# Patient Record
(demographics unavailable — no encounter records)

---

## 2024-11-12 NOTE — WORK
[Sprain/Strain] : sprain/strain [Was the competent medical cause of the injury] : was the competent medical cause of the injury [Are consistent with the injury] : are consistent with the injury [Consistent with my objective findings] : consistent with my objective findings [Total (100%)] : total (100%) [Does not reveal pre-existing condition(s) that may affect treatment/prognosis] : does not reveal pre-existing condition(s) that may affect treatment/prognosis [Cannot return to work because ________] : cannot return to work because [unfilled] [FreeTextEntry1] : guarded

## 2024-11-12 NOTE — ASSESSMENT
[FreeTextEntry1] : The patient was advised of the diagnosis. The natural history of the pathology was explained in full to the patient in layman's terms. All questions were answered. The risks and benefits of surgical and non-surgical treatment alternatives were explained in full to the patient.  MRI of hand R/O flexor tendon rupture MRI of neck to R/o HNP  Will try MDP and Cyclobenzaprine for neck and shoulder pain  Follow up with spine specialist for MRI  Follow up after MRI of hand

## 2024-11-12 NOTE — HISTORY OF PRESENT ILLNESS
[Not working due to injury] : Work status: not working due to injury [de-identified] : WC DOI 11/9/24   11/12/24: Pt is here for RT Shoulder/ index finger pain. Pt states that heavy objects fell onto him while loading his truck. Feels numbness/ tingling in his RT thumb and index finger. States that he cant bend his index finger. Went to [facility] on 11/9/24 D [] : no [de-identified] : ER [de-identified] :

## 2024-11-12 NOTE — IMAGING
[de-identified] : RIGHT SHOULDER IR L4 index finger no PIP or DIP flexion FDS working but not FDP deformity in right wrist from old fracture, healed scar  C-SPINE Full ROM with pain (-) Spurling's Decreased sensation of thumb and index fingers + trap tenderness and spasm on R  x-ray 3 views right shoulder shows GH arthritis, calcific tendinitis  x-ray 2 views of cervical spine shows DDD, worse at C6/7  X-ray of hand shows no sign of fracture, DDD

## 2024-12-09 NOTE — IMAGING
[de-identified] : RIGHT SHOULDER IR L4 index finger no PIP or DIP flexion FDS working / mild flexion at FDP. deformity in right wrist from old fracture, healed scar thenar atrophy noted with + Tinel Sign over right carpal tunnel.  C-SPINE Full ROM with pain (-) Spurling's Decreased sensation of thumb, index and middlefingers only negative Tinel Signs over right Cubital + Tinel over the Carpal Tunnel + trap tenderness and spasm on R (mild)    11/12/2024:  x-ray 3 views right shoulder shows GH arthritis, calcific tendinitis  x-ray 2 views of cervical spine shows DDD, worse at C6/7  X-ray of hand shows no sign of fracture, DDD

## 2024-12-09 NOTE — HISTORY OF PRESENT ILLNESS
[Not working due to injury] : Work status: not working due to injury [de-identified] :  DOI 11/9/24    12/9/2024: pt here for possible right index finger FDS rupture, as well as cervical pain.  Pt continues to complain of tingling to the index, middle and ring fingers. Pt states we are still waiting on authorization for cervical and right finger MRIs, and he has not had them as previously recommended (previous finger MRI was ordered STAT).  Pt is OOW due to injury.     11/21/24: Pt is here for Rt Shoulder. Pt was not able to get MRI yesterday. Pt states  has not authorized MRIs.  Pt is OOW due to injury.   Pt states MDP as flexeril did not provide comfort.    11/12/24: Pt is here for RT Shoulder/ index finger pain. Pt states that heavy objects fell onto him while loading his truck. Feels numbness/ tingling in his RT thumb and index finger. States that he can't bend his index finger. Went to [facility] on 11/9/24 D [] : no [de-identified] : ER [de-identified] :

## 2024-12-09 NOTE — ASSESSMENT
[FreeTextEntry1] : The patient was advised of the diagnosis. The natural history of the pathology was explained in full to the patient in layman's terms. All questions were answered. The risks and benefits of surgical and non-surgical treatment alternatives were explained in full to the patient.  MRI stat of hand R/O flexor tendon rupture again ordered  MRI of neck to R/o HNP again ordered  Follow up with spine specialist for cervical spine MRI discussion.  Follow up after MRI of hand to discuss results / tx options.   Pt remains disabled. instructed on hourly digit ROM to prevent stiffness. Pt informed that due to right hand atrophy from chronic carpal tunnel syndrome, that his hand tingling is likely permanent.

## 2024-12-09 NOTE — REASON FOR VISIT
[FreeTextEntry2] : wc doi 11/9/24 Follow up to RT shoulder/ index finger patient states pain is worse than last time

## 2024-12-09 NOTE — WORK
[Sprain/Strain] : sprain/strain [Was the competent medical cause of the injury] : was the competent medical cause of the injury [Are consistent with the injury] : are consistent with the injury [Consistent with my objective findings] : consistent with my objective findings [Total (100%)] : total (100%) [Does not reveal pre-existing condition(s) that may affect treatment/prognosis] : does not reveal pre-existing condition(s) that may affect treatment/prognosis [Cannot return to work because ________] : cannot return to work because [unfilled] [Lifting] : lifting [Pulling/Pushing] : pulling/pushing [Reaching overhead] : reaching overhead [Unknown at this time] : : unknown at this time [Patient] : patient [FreeTextEntry1] : guarded